# Patient Record
Sex: MALE | Race: WHITE | NOT HISPANIC OR LATINO | URBAN - METROPOLITAN AREA
[De-identification: names, ages, dates, MRNs, and addresses within clinical notes are randomized per-mention and may not be internally consistent; named-entity substitution may affect disease eponyms.]

---

## 2020-07-25 ENCOUNTER — TELEPHONE ENCOUNTER (OUTPATIENT)
Dept: URBAN - METROPOLITAN AREA CLINIC 13 | Facility: CLINIC | Age: 40
End: 2020-07-25

## 2020-07-25 RX ORDER — POLYETHYLENE GLYCOL 3350, SODIUM SULFATE, SODIUM CHLORIDE, POTASSIUM CHLORIDE, ASCORBIC ACID, SODIUM ASCORBATE 7.5-2.691G
USE AS DIRECTED KIT ORAL
Qty: 1 | Refills: 0 | OUTPATIENT
Start: 2013-08-14 | End: 2014-11-10

## 2020-07-25 RX ORDER — SACCHAROMYCES BOULARDII 250 MG
USE AS DIRECTED CAPSULE ORAL
Refills: 0 | OUTPATIENT
End: 2016-10-24

## 2020-07-25 RX ORDER — SULFASALAZINE 500 MG/1
TAKE 2 TABLET 3 TIMES DAILY TABLET ORAL
Qty: 540 | Refills: 3 | OUTPATIENT
Start: 2016-04-28 | End: 2016-05-05

## 2020-07-25 RX ORDER — CETIRIZINE HYDROCHLORIDE 10 MG/1
TAKE 1 TABLET DAILY AS DIRECTED TABLET, FILM COATED ORAL
Refills: 0 | OUTPATIENT
End: 2018-08-07

## 2020-07-25 RX ORDER — B-COMPLEX WITH VITAMIN C
TAKE 1 TABLET DAILY TABLET ORAL
Refills: 0 | OUTPATIENT
End: 2018-08-07

## 2020-07-25 RX ORDER — METHYLPREDNISOLONE 4 MG/1
TABS IN 1ST DAY; THEN 5 TABS IN 2ND DAY; THEN 4 TABS IN 3RD DAY ;THEN 3 TABS IN 4TH DAY; 2 TABS IN 5TH DAY; THEN 1 TAB LAST DAY; THEN STOP TABLET ORAL
Qty: 22 | Refills: 0 | OUTPATIENT
Start: 2016-05-05 | End: 2016-05-12

## 2020-07-25 RX ORDER — OMEPRAZOLE 40 MG/1
TAKE 1 CAPSULE DAILY CAPSULE, DELAYED RELEASE ORAL
Qty: 90 | Refills: 1 | OUTPATIENT
Start: 2013-08-14 | End: 2018-02-26

## 2020-07-25 RX ORDER — METHYLCELLULOSE 500 MG/1
TAKE 1 TABLET AT BEDTIME TABLET ORAL
Refills: 0 | OUTPATIENT
End: 2016-10-24

## 2020-07-26 ENCOUNTER — TELEPHONE ENCOUNTER (OUTPATIENT)
Dept: URBAN - METROPOLITAN AREA CLINIC 13 | Facility: CLINIC | Age: 40
End: 2020-07-26

## 2020-07-26 RX ORDER — MESALAMINE 500 MG/1
TAKE 2 CAPSULES 4 TIMES DAILY CAPSULE ORAL
Qty: 720 | Refills: 3 | Status: ACTIVE | COMMUNITY
Start: 2020-03-12

## 2020-07-26 RX ORDER — MULTIVIT-MIN/IRON/FOLIC ACID/K 18-600-40
TAKE 1 CAPSULE DAILY CAPSULE ORAL
Qty: 30 | Refills: 3 | Status: ACTIVE | COMMUNITY
Start: 2019-11-13

## 2020-07-26 RX ORDER — CYANOCOBALAMIN 1000 UG/ML
INJECTION INTRAMUSCULAR; SUBCUTANEOUS
Qty: 4 | Refills: 0 | Status: ACTIVE | COMMUNITY
Start: 2019-11-03

## 2020-07-26 RX ORDER — DICLOFENAC SODIUM 10 MG/G
GEL TOPICAL
Qty: 300 | Refills: 0 | Status: ACTIVE | COMMUNITY
Start: 2019-11-11

## 2020-07-26 RX ORDER — CEFUROXIME AXETIL 250 MG/1
TABLET ORAL
Qty: 20 | Refills: 0 | Status: ACTIVE | COMMUNITY
Start: 2018-06-04

## 2020-07-26 RX ORDER — ICOSAPENT ETHYL 1000 MG/1
TAKE 2 CAPSULE TWICE DAILY CAPSULE ORAL
Refills: 0 | Status: ACTIVE | COMMUNITY
Start: 2019-12-04

## 2020-07-26 RX ORDER — PREDNISONE 10 MG/1
TABLET ORAL
Qty: 21 | Refills: 0 | Status: ACTIVE | COMMUNITY
Start: 2018-06-04

## 2020-07-26 RX ORDER — CHOLECALCIFEROL (VITAMIN D3) 1250 MCG
TAKE 1 CAPSULE BY MOUTH WEEKLY CAPSULE ORAL
Qty: 4 | Refills: 0 | Status: ACTIVE | COMMUNITY
Start: 2019-09-10

## 2020-07-26 RX ORDER — FLUOCINONIDE 1 MG/G
CREAM TOPICAL
Qty: 120 | Refills: 0 | Status: ACTIVE | COMMUNITY
Start: 2013-08-07

## 2020-07-26 RX ORDER — OMEPRAZOLE 40 MG/1
TAKE 1 CAPSULE BY MOUTH ONCE DAILY CAPSULE, DELAYED RELEASE ORAL
Qty: 30 | Refills: 5 | Status: ACTIVE | COMMUNITY
Start: 2019-12-16

## 2020-07-26 RX ORDER — METHYLPREDNISOLONE 4 MG/1
TABLET ORAL
Qty: 21 | Refills: 0 | Status: ACTIVE | COMMUNITY
Start: 2018-09-05

## 2020-07-26 RX ORDER — AMOXICILLIN 500 MG/1
TAKE ONE TABLET BY MOUTH TWICE A DAY TABLET, FILM COATED ORAL
Qty: 20 | Refills: 0 | Status: ACTIVE | COMMUNITY
Start: 2019-10-17

## 2020-08-11 ENCOUNTER — WEB ENCOUNTER (OUTPATIENT)
Dept: URBAN - METROPOLITAN AREA CLINIC 113 | Facility: CLINIC | Age: 40
End: 2020-08-11

## 2020-08-11 RX ORDER — OMEPRAZOLE 40 MG/1
TAKE 1 CAPSULE BY MOUTH ONCE DAILY CAPSULE, DELAYED RELEASE ORAL
Qty: 30 | Refills: 5
Start: 2019-12-16

## 2020-12-21 ENCOUNTER — TELEPHONE ENCOUNTER (OUTPATIENT)
Dept: URBAN - METROPOLITAN AREA CLINIC 113 | Facility: CLINIC | Age: 40
End: 2020-12-21

## 2020-12-21 ENCOUNTER — LAB OUTSIDE AN ENCOUNTER (OUTPATIENT)
Dept: URBAN - METROPOLITAN AREA CLINIC 113 | Facility: CLINIC | Age: 40
End: 2020-12-21

## 2020-12-21 VITALS — HEIGHT: 68 IN | BODY MASS INDEX: 28.79 KG/M2 | WEIGHT: 190 LBS

## 2020-12-21 RX ORDER — MESALAMINE 500 MG/1
2 CAPSULES CAPSULE ORAL
Qty: 240 | OUTPATIENT
Start: 2020-12-21 | End: 2021-01-20

## 2020-12-21 RX ORDER — CEFUROXIME AXETIL 250 MG/1
TABLET ORAL
Qty: 20 | Refills: 0 | Status: DISCONTINUED | COMMUNITY
Start: 2018-06-04

## 2020-12-21 RX ORDER — CYANOCOBALAMIN 1000 UG/ML
INJECTION INTRAMUSCULAR; SUBCUTANEOUS
Qty: 4 | Refills: 0 | Status: DISCONTINUED | COMMUNITY
Start: 2019-11-03

## 2020-12-21 RX ORDER — MULTIVIT-MIN/IRON/FOLIC ACID/K 18-600-40
TAKE 1 CAPSULE DAILY CAPSULE ORAL
Qty: 30 | Refills: 3 | Status: ACTIVE | COMMUNITY
Start: 2019-11-13

## 2020-12-21 RX ORDER — FLUOCINONIDE 1 MG/G
CREAM TOPICAL
Qty: 120 | Refills: 0 | Status: DISCONTINUED | COMMUNITY
Start: 2013-08-07

## 2020-12-21 RX ORDER — MESALAMINE 500 MG/1
TAKE 2 CAPSULES 4 TIMES DAILY CAPSULE ORAL
Qty: 720 | Refills: 3 | Status: ACTIVE | COMMUNITY
Start: 2020-03-12

## 2020-12-21 RX ORDER — CHOLECALCIFEROL (VITAMIN D3) 1250 MCG
TAKE 1 CAPSULE BY MOUTH WEEKLY CAPSULE ORAL
Qty: 4 | Refills: 0 | Status: ACTIVE | COMMUNITY
Start: 2019-09-10

## 2020-12-21 RX ORDER — METHYLPREDNISOLONE 4 MG/1
TABLET ORAL
Qty: 21 | Refills: 0 | Status: DISCONTINUED | COMMUNITY
Start: 2018-09-05

## 2020-12-21 RX ORDER — PREDNISONE 10 MG/1
TABLET ORAL
Qty: 21 | Refills: 0 | Status: DISCONTINUED | COMMUNITY
Start: 2018-06-04

## 2020-12-21 RX ORDER — OMEPRAZOLE 40 MG/1
TAKE 1 CAPSULE BY MOUTH ONCE DAILY CAPSULE, DELAYED RELEASE ORAL
Qty: 30 | Refills: 5 | Status: ACTIVE | COMMUNITY
Start: 2019-12-16

## 2020-12-21 RX ORDER — DICLOFENAC SODIUM 10 MG/G
GEL TOPICAL
Qty: 300 | Refills: 0 | Status: DISCONTINUED | COMMUNITY
Start: 2019-11-11

## 2020-12-21 RX ORDER — OMEPRAZOLE 40 MG/1
1 CAPSULE 30 MINUTES BEFORE MORNING MEAL CAPSULE, DELAYED RELEASE ORAL ONCE A DAY
Qty: 30 | OUTPATIENT
Start: 2020-12-21

## 2020-12-21 RX ORDER — ICOSAPENT ETHYL 1000 MG/1
TAKE 2 CAPSULE TWICE DAILY CAPSULE ORAL
Refills: 0 | Status: ACTIVE | COMMUNITY
Start: 2019-12-04

## 2020-12-21 RX ORDER — AMOXICILLIN 500 MG/1
TAKE ONE TABLET BY MOUTH TWICE A DAY TABLET, FILM COATED ORAL
Qty: 20 | Refills: 0 | Status: DISCONTINUED | COMMUNITY
Start: 2019-10-17

## 2020-12-21 RX ORDER — SODIUM, POTASSIUM,MAG SULFATES 17.5-3.13G
354 ML SOLUTION, RECONSTITUTED, ORAL ORAL
Qty: 354 ML | Refills: 0 | OUTPATIENT
Start: 2020-12-21 | End: 2020-12-22

## 2021-01-25 ENCOUNTER — CLAIMS CREATED FROM THE CLAIM WINDOW (OUTPATIENT)
Dept: URBAN - METROPOLITAN AREA CLINIC 4 | Facility: CLINIC | Age: 41
End: 2021-01-25
Payer: COMMERCIAL

## 2021-01-25 ENCOUNTER — OFFICE VISIT (OUTPATIENT)
Dept: URBAN - METROPOLITAN AREA SURGERY CENTER 25 | Facility: SURGERY CENTER | Age: 41
End: 2021-01-25
Payer: COMMERCIAL

## 2021-01-25 DIAGNOSIS — K51.00 CHRONIC PANCOLONIC ULCERATIVE COLITIS: ICD-10-CM

## 2021-01-25 DIAGNOSIS — K63.89 OTHER SPECIFIED DISEASES OF INTESTINE: ICD-10-CM

## 2021-01-25 PROCEDURE — 88342 IMHCHEM/IMCYTCHM 1ST ANTB: CPT | Performed by: PATHOLOGY

## 2021-01-25 PROCEDURE — 45380 COLONOSCOPY AND BIOPSY: CPT | Performed by: INTERNAL MEDICINE

## 2021-01-25 PROCEDURE — G8907 PT DOC NO EVENTS ON DISCHARG: HCPCS | Performed by: INTERNAL MEDICINE

## 2021-01-25 PROCEDURE — 88305 TISSUE EXAM BY PATHOLOGIST: CPT | Performed by: PATHOLOGY

## 2021-01-25 PROCEDURE — 88341 IMHCHEM/IMCYTCHM EA ADD ANTB: CPT | Performed by: PATHOLOGY

## 2021-01-25 RX ORDER — CHOLECALCIFEROL (VITAMIN D3) 1250 MCG
TAKE 1 CAPSULE BY MOUTH WEEKLY CAPSULE ORAL
Qty: 4 | Refills: 0 | Status: ACTIVE | COMMUNITY
Start: 2019-09-10

## 2021-01-25 RX ORDER — OMEPRAZOLE 40 MG/1
1 CAPSULE 30 MINUTES BEFORE MORNING MEAL CAPSULE, DELAYED RELEASE ORAL ONCE A DAY
Qty: 30 | Status: ACTIVE | COMMUNITY
Start: 2020-12-21

## 2021-01-25 RX ORDER — ICOSAPENT ETHYL 1000 MG/1
TAKE 2 CAPSULE TWICE DAILY CAPSULE ORAL
Refills: 0 | Status: ACTIVE | COMMUNITY
Start: 2019-12-04

## 2021-01-25 RX ORDER — OMEPRAZOLE 40 MG/1
TAKE 1 CAPSULE BY MOUTH ONCE DAILY CAPSULE, DELAYED RELEASE ORAL
Qty: 30 | Refills: 5 | Status: ACTIVE | COMMUNITY
Start: 2019-12-16

## 2021-01-25 RX ORDER — MESALAMINE 500 MG/1
TAKE 2 CAPSULES 4 TIMES DAILY CAPSULE ORAL
Qty: 720 | Refills: 3 | Status: ACTIVE | COMMUNITY
Start: 2020-03-12

## 2021-01-25 RX ORDER — MULTIVIT-MIN/IRON/FOLIC ACID/K 18-600-40
TAKE 1 CAPSULE DAILY CAPSULE ORAL
Qty: 30 | Refills: 3 | Status: ACTIVE | COMMUNITY
Start: 2019-11-13

## 2021-02-15 ENCOUNTER — OFFICE VISIT (OUTPATIENT)
Dept: URBAN - METROPOLITAN AREA CLINIC 113 | Facility: CLINIC | Age: 41
End: 2021-02-15

## 2021-02-15 RX ORDER — CHOLECALCIFEROL (VITAMIN D3) 1250 MCG
TAKE 1 CAPSULE BY MOUTH WEEKLY CAPSULE ORAL
Qty: 4 | Refills: 0 | Status: ACTIVE | COMMUNITY
Start: 2019-09-10

## 2021-02-15 RX ORDER — MESALAMINE 500 MG/1
TAKE 2 CAPSULES 4 TIMES DAILY CAPSULE ORAL
Qty: 720 | Refills: 3 | Status: ACTIVE | COMMUNITY
Start: 2020-03-12

## 2021-02-15 RX ORDER — MULTIVIT-MIN/IRON/FOLIC ACID/K 18-600-40
TAKE 1 CAPSULE DAILY CAPSULE ORAL
Qty: 30 | Refills: 3 | Status: ACTIVE | COMMUNITY
Start: 2019-11-13

## 2021-02-15 RX ORDER — OMEPRAZOLE 40 MG/1
1 CAPSULE 30 MINUTES BEFORE MORNING MEAL CAPSULE, DELAYED RELEASE ORAL ONCE A DAY
Qty: 30 | Status: ACTIVE | COMMUNITY
Start: 2020-12-21

## 2021-02-15 RX ORDER — OMEPRAZOLE 40 MG/1
TAKE 1 CAPSULE BY MOUTH ONCE DAILY CAPSULE, DELAYED RELEASE ORAL
Qty: 30 | Refills: 5 | Status: ACTIVE | COMMUNITY
Start: 2019-12-16

## 2021-02-15 RX ORDER — ICOSAPENT ETHYL 1000 MG/1
TAKE 2 CAPSULE TWICE DAILY CAPSULE ORAL
Refills: 0 | Status: ACTIVE | COMMUNITY
Start: 2019-12-04

## 2021-02-15 NOTE — HPI-TODAY'S VISIT:
This is a 40-year-old male with a history of pan ulcerative colitis diagnosed in 1996 on Pentasa, GERD, and vitamin D deficiency presenting for follow-up after a surveillance colonoscopy.  Colonoscopy 1/25/2021: BB PS 9, the entire colon appeared normal.  Random biopsies were obtained.   biopsies demonstrated prominent mucosal lymphoid aggregate; otherwise no significant abnormality.  Surveillance recommended in 2023.

## 2021-03-18 ENCOUNTER — TELEPHONE ENCOUNTER (OUTPATIENT)
Dept: URBAN - METROPOLITAN AREA CLINIC 23 | Facility: CLINIC | Age: 41
End: 2021-03-18

## 2021-03-18 RX ORDER — MESALAMINE 500 MG/1
TAKE 2 CAPSULES 4 TIMES DAILY CAPSULE ORAL
Qty: 720 | Refills: 3
Start: 2020-03-12

## 2021-04-12 ENCOUNTER — WEB ENCOUNTER (OUTPATIENT)
Dept: URBAN - METROPOLITAN AREA CLINIC 113 | Facility: CLINIC | Age: 41
End: 2021-04-12

## 2021-04-12 ENCOUNTER — OFFICE VISIT (OUTPATIENT)
Dept: URBAN - METROPOLITAN AREA CLINIC 113 | Facility: CLINIC | Age: 41
End: 2021-04-12
Payer: COMMERCIAL

## 2021-04-12 ENCOUNTER — LAB OUTSIDE AN ENCOUNTER (OUTPATIENT)
Dept: URBAN - METROPOLITAN AREA CLINIC 113 | Facility: CLINIC | Age: 41
End: 2021-04-12

## 2021-04-12 VITALS
HEART RATE: 70 BPM | WEIGHT: 200 LBS | RESPIRATION RATE: 18 BRPM | DIASTOLIC BLOOD PRESSURE: 98 MMHG | BODY MASS INDEX: 30.31 KG/M2 | HEIGHT: 68 IN | SYSTOLIC BLOOD PRESSURE: 147 MMHG | TEMPERATURE: 98.1 F

## 2021-04-12 DIAGNOSIS — R10.12 LEFT UPPER QUADRANT ABDOMINAL PAIN: ICD-10-CM

## 2021-04-12 DIAGNOSIS — R13.10 ESOPHAGEAL DYSPHAGIA: ICD-10-CM

## 2021-04-12 DIAGNOSIS — K51.00 ULCERATIVE PANCOLITIS WITHOUT COMPLICATION: ICD-10-CM

## 2021-04-12 DIAGNOSIS — R14.3 EXCESSIVE GAS: ICD-10-CM

## 2021-04-12 DIAGNOSIS — E55.9 VITAMIN D DEFICIENCY: ICD-10-CM

## 2021-04-12 DIAGNOSIS — R19.4 CHANGE IN BOWEL HABIT: ICD-10-CM

## 2021-04-12 PROCEDURE — 99214 OFFICE O/P EST MOD 30 MIN: CPT | Performed by: NURSE PRACTITIONER

## 2021-04-12 RX ORDER — ICOSAPENT ETHYL 1000 MG/1
TAKE 2 CAPSULE TWICE DAILY CAPSULE ORAL
Refills: 0 | Status: DISCONTINUED | COMMUNITY
Start: 2019-12-04

## 2021-04-12 RX ORDER — MULTIVIT-MIN/IRON/FOLIC ACID/K 18-600-40
TAKE 1 CAPSULE DAILY CAPSULE ORAL
Qty: 30 | Refills: 3 | Status: DISCONTINUED | COMMUNITY
Start: 2019-11-13

## 2021-04-12 RX ORDER — OMEPRAZOLE 40 MG/1
TAKE 1 CAPSULE BY MOUTH ONCE DAILY CAPSULE, DELAYED RELEASE ORAL
Qty: 30 | Refills: 5 | Status: DISCONTINUED | COMMUNITY
Start: 2019-12-16

## 2021-04-12 RX ORDER — MESALAMINE 500 MG/1
TAKE 2 CAPSULES 4 TIMES DAILY CAPSULE ORAL
Qty: 720 | Refills: 3 | Status: ACTIVE | COMMUNITY
Start: 2020-03-12

## 2021-04-12 RX ORDER — CHOLECALCIFEROL (VITAMIN D3) 1250 MCG
TAKE 1 CAPSULE BY MOUTH WEEKLY CAPSULE ORAL
Qty: 4 | Refills: 0 | Status: DISCONTINUED | COMMUNITY
Start: 2019-09-10

## 2021-04-12 RX ORDER — OMEPRAZOLE 40 MG/1
1 CAPSULE 30 MINUTES BEFORE MORNING MEAL CAPSULE, DELAYED RELEASE ORAL ONCE A DAY
Qty: 30 | Status: DISCONTINUED | COMMUNITY
Start: 2020-12-21

## 2021-04-12 NOTE — HPI-OTHER HISTORIES
Colonoscopy 1/25/2021:BBPS 9, the entire colon appeared normal to the terminal ileum.  Biopsies of the ascending, transverse, descending, and sigmoid colon demonstrated prominent mucosal lymphoid aggregate; otherwise no significant abnormality. CT of the abdomen and pelvis with contrast 9/23/2019:No evidence of acute intra-abdominal/pelvic process.  No evidence of inflammatory bowel disease.  Multilevel degenerative changes of the spine with grade 1 retrolisthesis of L5 on S1 noted. EGD 3/23/2013:Normal esophagus, chronic gastritis status post biopsy, normal examined duodenum.  No path.

## 2021-04-12 NOTE — HPI-TODAY'S VISIT:
This is a 40-year-old male with a history of pan ulcerative colitis diagnosed in 1996 presenting for follow-up after a surveillance colonoscopy; results below.    He was last seen in the office 12/16/2019.  He was in deep remission on Pentasa.  He had undergone labs and a CT to evaluate left upper quadrant pain.  These were unremarkable.  It was discussed his symptoms may be acid peptic in origin.  He was prescribed omeprazole 40 mg daily and instructed to continue Pentasa 500 mg 2 4 times a day.  Labs had demonstrated improvement of vitamin D deficiency.  He has experienced a sensation of feeling "a little off" since he obtained his initial Covid vaccine in December.  He reports a change in bowel habits with malodorous flatus and stool.  He has a bowel movement once to 3 times per day.  This is his typical bowel pattern.  Occasionally, he has loose or watery stools but also reports formed or soft stools.  Nocturnal stools are infrequent.  He reports urgency to defecate.  He has experienced straining associated with his bowel movements and feels as though he has to "rock back-and-forth" on the toilet in order to pass stool which is a new development.  He denies red blood per rectum or melena.  he reports a "hard gas" that is  malodorous as well as a change in his  typical stool odor.  He is taking Benefiber or Metamucil on most days.  He began a probiotic in January.  He is on a different probiotic now and reports no change thus far in symptoms.  He denies changes in his diet.  He admits to the use of some dairy products.  He continues to report pain indicated in the left upper quadrant.  It occurs daily and  has been more pronounced in the last few weeks.  He describes it as "feeling something there" or "a sensation."  It is constant and occasionally worsens with meals.  There is no change with bowel movements.  He has heartburn once a week.  He  admits mild difficulties with swallowing describing solid foods briefly lodged in the center of his chest relieved with a second swallow or with drinking liquid.  This happens weekly.  He took omeprazole as prescribed after his last visit and reports there was no change in his symptoms. He reports he is having symptoms that are consistent with exocrine pancreatic insufficiency. He is due a physical and has not had recent labs.

## 2021-04-16 ENCOUNTER — TELEPHONE ENCOUNTER (OUTPATIENT)
Dept: URBAN - METROPOLITAN AREA CLINIC 113 | Facility: CLINIC | Age: 41
End: 2021-04-16

## 2021-05-10 ENCOUNTER — OFFICE VISIT (OUTPATIENT)
Dept: URBAN - METROPOLITAN AREA SURGERY CENTER 25 | Facility: SURGERY CENTER | Age: 41
End: 2021-05-10
Payer: COMMERCIAL

## 2021-05-10 ENCOUNTER — CLAIMS CREATED FROM THE CLAIM WINDOW (OUTPATIENT)
Dept: URBAN - METROPOLITAN AREA CLINIC 4 | Facility: CLINIC | Age: 41
End: 2021-05-10
Payer: COMMERCIAL

## 2021-05-10 DIAGNOSIS — R13.10 ABNORMAL SWALLOWING: ICD-10-CM

## 2021-05-10 DIAGNOSIS — R10.12 ABDOMINAL BURNING SENSATION IN LEFT UPPER QUADRANT: ICD-10-CM

## 2021-05-10 DIAGNOSIS — K31.89 GASTRIC PERFORATION WITHOUT ULCER: ICD-10-CM

## 2021-05-10 PROCEDURE — G8907 PT DOC NO EVENTS ON DISCHARG: HCPCS | Performed by: INTERNAL MEDICINE

## 2021-05-10 PROCEDURE — 88312 SPECIAL STAINS GROUP 1: CPT | Performed by: PATHOLOGY

## 2021-05-10 PROCEDURE — 88305 TISSUE EXAM BY PATHOLOGIST: CPT | Performed by: PATHOLOGY

## 2021-05-10 PROCEDURE — 43239 EGD BIOPSY SINGLE/MULTIPLE: CPT | Performed by: INTERNAL MEDICINE

## 2021-05-10 PROCEDURE — 43450 DILATE ESOPHAGUS 1/MULT PASS: CPT | Performed by: INTERNAL MEDICINE

## 2021-05-10 RX ORDER — MESALAMINE 500 MG/1
TAKE 2 CAPSULES 4 TIMES DAILY CAPSULE ORAL
Qty: 720 | Refills: 3 | Status: ACTIVE | COMMUNITY
Start: 2020-03-12

## 2021-05-19 ENCOUNTER — TELEPHONE ENCOUNTER (OUTPATIENT)
Dept: URBAN - METROPOLITAN AREA CLINIC 113 | Facility: CLINIC | Age: 41
End: 2021-05-19

## 2021-05-19 RX ORDER — SUCRALFATE 1 G
1 TABLET ON AN EMPTY STOMACH TABLET ORAL
Qty: 120 TABLET | Refills: 1 | OUTPATIENT
Start: 2021-05-19 | End: 2021-07-18

## 2021-05-19 RX ORDER — MESALAMINE 500 MG/1
TAKE 2 CAPSULES 4 TIMES DAILY CAPSULE ORAL
Qty: 720 | Refills: 3 | Status: ACTIVE | COMMUNITY
Start: 2020-03-12

## 2021-05-20 ENCOUNTER — WEB ENCOUNTER (OUTPATIENT)
Dept: URBAN - METROPOLITAN AREA CLINIC 113 | Facility: CLINIC | Age: 41
End: 2021-05-20

## 2021-05-20 RX ORDER — MESALAMINE 500 MG/1
TAKE 2 CAPSULES 4 TIMES DAILY CAPSULE ORAL
Qty: 240 | Refills: 3
Start: 2020-03-12 | End: 2021-09-17

## 2021-07-26 ENCOUNTER — OFFICE VISIT (OUTPATIENT)
Dept: URBAN - METROPOLITAN AREA CLINIC 113 | Facility: CLINIC | Age: 41
End: 2021-07-26
Payer: COMMERCIAL

## 2021-07-26 VITALS
DIASTOLIC BLOOD PRESSURE: 93 MMHG | HEIGHT: 68 IN | HEART RATE: 71 BPM | TEMPERATURE: 98 F | BODY MASS INDEX: 30.31 KG/M2 | SYSTOLIC BLOOD PRESSURE: 140 MMHG | WEIGHT: 200 LBS

## 2021-07-26 DIAGNOSIS — R13.10 ESOPHAGEAL DYSPHAGIA: ICD-10-CM

## 2021-07-26 DIAGNOSIS — E55.9 VITAMIN D DEFICIENCY: ICD-10-CM

## 2021-07-26 DIAGNOSIS — R14.3 EXCESSIVE GAS: ICD-10-CM

## 2021-07-26 DIAGNOSIS — R10.12 LEFT UPPER QUADRANT ABDOMINAL PAIN: ICD-10-CM

## 2021-07-26 DIAGNOSIS — R19.4 CHANGE IN BOWEL HABIT: ICD-10-CM

## 2021-07-26 DIAGNOSIS — K51.00 ULCERATIVE PANCOLITIS WITHOUT COMPLICATION: ICD-10-CM

## 2021-07-26 PROBLEM — 80301007: Status: ACTIVE | Noted: 2021-04-12

## 2021-07-26 PROBLEM — 129851009: Status: ACTIVE | Noted: 2021-04-12

## 2021-07-26 PROBLEM — 34713006: Status: ACTIVE | Noted: 2021-04-12

## 2021-07-26 PROBLEM — 40890009: Status: ACTIVE | Noted: 2021-04-12

## 2021-07-26 PROCEDURE — 99214 OFFICE O/P EST MOD 30 MIN: CPT | Performed by: INTERNAL MEDICINE

## 2021-07-26 RX ORDER — LORATADINE 10 MG/1
1 TABLET TABLET ORAL ONCE A DAY
Status: ACTIVE | COMMUNITY

## 2021-07-26 RX ORDER — PSYLLIUM SEED (WITH DEXTROSE)
AS DIRECTED POWDER (GRAM) ORAL
Status: ACTIVE | COMMUNITY

## 2021-07-26 RX ORDER — MESALAMINE 500 MG/1
TAKE 2 CAPSULES 4 TIMES DAILY CAPSULE ORAL
Qty: 240 | Refills: 3 | Status: ACTIVE | COMMUNITY
Start: 2020-03-12 | End: 2021-09-17

## 2021-07-26 NOTE — HPI-TODAY'S VISIT:
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 He was last seen here on 4/12/21 after a surveillance colonoscopy; results below.    Whe he was previously seen in the office on 12/16/2019, he was in deep remission on Pentasa.  He had undergone labs and a CT to evaluate left upper quadrant pain.  These were unremarkable.  It was discussed his symptoms may be acid peptic in origin.  He was prescribed omeprazole 40 mg daily and instructed to continue Pentasa 500 mg two tablets 4 times a day.  Labs had demonstrated improvement of vitamin D deficiency.  If the time of his visit here in April, he described a sensation of feeling "a little off" since he obtained his initial Covid vaccine in December.  He reported a change in bowel habits with malodorous flatus and stool.  He was having a bowel movement once to 3 times per day, which is his typical bowel pattern.  Occasionally, he had loose or watery stools but also reported formed or soft stools.  Nocturnal stools were infrequent.  He reported urgency to defecate.  He has experienced straining associated with his bowel movements and felt as though he had to "rock back-and-forth" on the toilet in order to pass stool, which was a new development.  He denied red blood per rectum or melena.  He reported the passage of excessively malodorous gas as well as a change in his  typical stool odor.  He was taking Benefiber or Metamucil on most days and began a probiotic in January.   He continued to report pain indicated in the left upper quadrant, occurring daily and more pronounced in the previous few weeks.  It was a constant "sensation," occasionally worse with meals.  There was no change with bowel movements.  He had heartburn once a week and admitted mild solid food dysphagia.  He took omeprazole as prescribed after his last visit with no change in his symptoms.  He reported he was having symptoms consistent with exocrine pancreatic insufficiency.   Labs and x-rays were ordered after his last visit.  He had an essentially normal complete metabolic panel, including a normal lipase, an abdominal x-ray series showing a moderate fecal load and some possible kidney stones in the left kidney, and a normal vitamin D level at 36.4.  His CBC is notable for a white blood cell count of 4900, and a hemoglobin of 15.9 g/dL.  Upper GI endoscopy performed on May 10, 2021 was notable for no abnormal findings to explain his dysphagia, but in.  Dilatation was undertaken with a 46 Thai Rubin dilator.  Endoscopic findings of gastritis related, but biopsies were negative for any pathology.  A CT scan of the abdomen and pelvis performed on June 21, 2021 revealing right lower quadrant mesenteric adenitis and some mild thickening of the wall the cecum possibly consistent with colitis.  The terminal ileum was normal.  After his last visit, he had labs performed and an abdominal x-ray series done.  His complete metabolic panel was essentially normal, with a sodium of 134, potassium 5.0, chloride 1-3, bicarbonate 28, glucose 112, BUN 13, creatinine 0.9, total protein 7.3, albumin 4.5, total bilirubin 0.6, AST 32, ALT 36, and alkaline phosphatase 52.  His lipase is 1:15.  CBC showed a white blood cell count of 4900, and a hemoglobin of 15.9.  An abdominal x-ray series showeed moderate fecal load, and some calcifications possibly consistent with kidney stones.  He was scheduled for an upper GI endoscopy which took place on May 10, 2021.  This revealed changes consistent with gastritis, although biopsies were negative.  Esophagus was empirically dilated with a 46 Thai Rubin dilator.  The patient subsequently had a CT scan at the abdomen and pelvis performed on June 21, 2021.  This revealed right lower quadrant mesenteric adenitis with cecal thickening, but a normal terminal ileum and no other abnormalities.  The patient took Carafate for about a month and stopped it.  His symptoms improved.  He has been on Dexilant 60 mg daily since the third week in May.  His dyspeptic complaints are controlled on this medication.  He switched Benefiber or Metamucil, as this seems to be helping his bowels better.  He has noted that he has increased symptoms of left upper quadrant discomfort when his bowels are not regular.  If his bowels are moving, he has fewer symptoms.  His dysphagia resolved after upper endoscopy with dilatation, and has not recurred.  He denies rectal bleeding, diarrhea, etc.

## 2021-08-04 ENCOUNTER — ERX REFILL RESPONSE (OUTPATIENT)
Dept: URBAN - METROPOLITAN AREA CLINIC 113 | Facility: CLINIC | Age: 41
End: 2021-08-04

## 2021-08-04 RX ORDER — MESALAMINE 500 MG/1
TAKE TWO CAPSULES BY MOUTH FOUR TIMES A DAY CAPSULE ORAL
Qty: 240 CAPSULE | Refills: 4 | OUTPATIENT

## 2021-08-04 RX ORDER — MESALAMINE 500 MG/1
TAKE 2 CAPSULES 4 TIMES DAILY CAPSULE ORAL
Qty: 240 | Refills: 3 | OUTPATIENT

## 2021-11-03 ENCOUNTER — ERX REFILL RESPONSE (OUTPATIENT)
Dept: URBAN - METROPOLITAN AREA CLINIC 113 | Facility: CLINIC | Age: 41
End: 2021-11-03

## 2021-11-03 RX ORDER — MESALAMINE 500 MG/1
TAKE TWO CAPSULES BY MOUTH FOUR TIMES A DAY CAPSULE ORAL
Qty: 240 CAPSULE | Refills: 4 | OUTPATIENT

## 2022-01-10 ENCOUNTER — ERX REFILL RESPONSE (OUTPATIENT)
Dept: URBAN - METROPOLITAN AREA CLINIC 113 | Facility: CLINIC | Age: 42
End: 2022-01-10

## 2022-01-10 RX ORDER — MESALAMINE 500 MG/1
TAKE TWO CAPSULES BY MOUTH FOUR TIMES A DAY CAPSULE ORAL
Qty: 240 CAPSULE | Refills: 4 | OUTPATIENT

## 2022-01-18 ENCOUNTER — ERX REFILL RESPONSE (OUTPATIENT)
Dept: URBAN - METROPOLITAN AREA CLINIC 113 | Facility: CLINIC | Age: 42
End: 2022-01-18

## 2022-01-18 RX ORDER — MESALAMINE 500 MG/1
TAKE TWO CAPSULES BY MOUTH FOUR TIMES A DAY CAPSULE ORAL
Qty: 240 CAPSULE | Refills: 4 | OUTPATIENT

## 2022-04-18 ENCOUNTER — ERX REFILL RESPONSE (OUTPATIENT)
Dept: URBAN - METROPOLITAN AREA CLINIC 113 | Facility: CLINIC | Age: 42
End: 2022-04-18

## 2022-04-18 RX ORDER — MESALAMINE 500 MG/1
TAKE TWO CAPSULES BY MOUTH FOUR TIMES A DAY CAPSULE ORAL
Qty: 240 CAPSULE | Refills: 4 | OUTPATIENT

## 2022-07-18 ENCOUNTER — ERX REFILL RESPONSE (OUTPATIENT)
Dept: URBAN - METROPOLITAN AREA CLINIC 113 | Facility: CLINIC | Age: 42
End: 2022-07-18

## 2022-07-18 RX ORDER — MESALAMINE 500 MG/1
TAKE TWO CAPSULES BY MOUTH FOUR TIMES A DAY CAPSULE ORAL
Qty: 240 CAPSULE | Refills: 4 | OUTPATIENT

## 2022-07-18 RX ORDER — MESALAMINE 500 MG/1
TAKE TWO CAPSULES BY MOUTH FOUR TIMES A DAY CAPSULE ORAL
Qty: 240 CAPSULE | Refills: 3 | OUTPATIENT

## 2022-08-01 ENCOUNTER — OFFICE VISIT (OUTPATIENT)
Dept: URBAN - METROPOLITAN AREA CLINIC 113 | Facility: CLINIC | Age: 42
End: 2022-08-01

## 2022-08-02 ENCOUNTER — TELEPHONE ENCOUNTER (OUTPATIENT)
Dept: URBAN - METROPOLITAN AREA CLINIC 113 | Facility: CLINIC | Age: 42
End: 2022-08-02

## 2022-08-03 ENCOUNTER — TELEPHONE ENCOUNTER (OUTPATIENT)
Dept: URBAN - METROPOLITAN AREA CLINIC 113 | Facility: CLINIC | Age: 42
End: 2022-08-03

## 2022-08-03 RX ORDER — MESALAMINE 500 MG/1
TAKE TWO CAPSULES BY MOUTH FOUR TIMES A DAY CAPSULE ORAL
Qty: 240 CAPSULE | Refills: 3 | Status: ACTIVE | COMMUNITY

## 2022-08-03 RX ORDER — PSYLLIUM SEED (WITH DEXTROSE)
AS DIRECTED POWDER (GRAM) ORAL
Status: ACTIVE | COMMUNITY

## 2022-08-03 RX ORDER — LORATADINE 10 MG/1
1 TABLET TABLET ORAL ONCE A DAY
Status: ACTIVE | COMMUNITY

## 2022-08-03 RX ORDER — MESALAMINE 375 MG/1
4 CAPSULES IN THE MORNING CAPSULE, EXTENDED RELEASE ORAL ONCE A DAY
Qty: 120 | Refills: 6 | OUTPATIENT
Start: 2022-08-03 | End: 2023-02-28

## 2022-08-03 RX ORDER — ESOMEPRAZOLE MAGNESIUM 40 MG/1
1 CAPSULE CAPSULE, DELAYED RELEASE ORAL TWICE DAILY
Qty: 60 | Refills: 3 | OUTPATIENT

## 2022-08-31 ENCOUNTER — OFFICE VISIT (OUTPATIENT)
Dept: URBAN - METROPOLITAN AREA CLINIC 113 | Facility: CLINIC | Age: 42
End: 2022-08-31
Payer: COMMERCIAL

## 2022-08-31 VITALS
BODY MASS INDEX: 29.4 KG/M2 | HEIGHT: 68 IN | HEART RATE: 70 BPM | TEMPERATURE: 98.8 F | SYSTOLIC BLOOD PRESSURE: 132 MMHG | WEIGHT: 194 LBS | DIASTOLIC BLOOD PRESSURE: 82 MMHG

## 2022-08-31 DIAGNOSIS — K51.00 ULCERATIVE PANCOLITIS WITHOUT COMPLICATION: ICD-10-CM

## 2022-08-31 DIAGNOSIS — K59.09 OTHER CONSTIPATION: ICD-10-CM

## 2022-08-31 DIAGNOSIS — R10.12 LEFT UPPER QUADRANT ABDOMINAL PAIN: ICD-10-CM

## 2022-08-31 DIAGNOSIS — E61.1 IRON DEFICIENCY: ICD-10-CM

## 2022-08-31 PROCEDURE — 99214 OFFICE O/P EST MOD 30 MIN: CPT | Performed by: NURSE PRACTITIONER

## 2022-08-31 RX ORDER — PSYLLIUM SEED (WITH DEXTROSE)
AS DIRECTED POWDER (GRAM) ORAL
Status: ACTIVE | COMMUNITY

## 2022-08-31 RX ORDER — ESOMEPRAZOLE MAGNESIUM 40 MG/1
1 CAPSULE CAPSULE, DELAYED RELEASE ORAL TWICE DAILY
Qty: 60 | Refills: 3 | Status: ACTIVE | COMMUNITY

## 2022-08-31 RX ORDER — MESALAMINE 500 MG/1
TAKE TWO CAPSULES BY MOUTH FOUR TIMES A DAY CAPSULE ORAL
Qty: 240 CAPSULE | Refills: 3 | Status: ACTIVE | COMMUNITY

## 2022-08-31 RX ORDER — HYOSCYAMINE SULFATE 0.125 MG
1 - 2 TABLETS UNDER THE TONGUE AND ALLOW TO DISSOLVE TABLET,DISINTEGRATING ORAL
Qty: 40 | Refills: 3 | OUTPATIENT
Start: 2022-08-31 | End: 2022-12-28

## 2022-08-31 RX ORDER — ATORVASTATIN CALCIUM 10 MG/1
1 TABLET TABLET, FILM COATED ORAL ONCE A DAY
Status: ACTIVE | COMMUNITY

## 2022-08-31 RX ORDER — LOSARTAN POTASSIUM AND HYDROCHLOROTHIAZIDE 50; 12.5 MG/1; MG/1
1 TABLET TABLET, FILM COATED ORAL ONCE A DAY
Status: ACTIVE | COMMUNITY

## 2022-08-31 RX ORDER — MESALAMINE 375 MG/1
4 CAPSULES IN THE MORNING CAPSULE, EXTENDED RELEASE ORAL ONCE A DAY
Qty: 120 | Refills: 6 | Status: ON HOLD | COMMUNITY
Start: 2022-08-03 | End: 2023-02-28

## 2022-08-31 NOTE — HPI-TODAY'S VISIT:
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 He was last seen 7/26/2021.  He had undergone an EGD in May 2021 revealing changes consistent with gastritis with negative biopsies and a normal esophagus which was empirically dilated to 46 French due to a prior complaint of dysphagia.  This had resolved at his visit.  He had mild constipation that had improved on Metamucil.  He had a persistent left upper quadrant abdominal pain.  A prior CT scan was unremarkable.  He reported it was better with defecation and functional pain was suspected.  He was to begin a diet low in fermentable sugars for excessive gas and use Charcocaps as needed.  Treatment for bacterial overgrowth was a consideration. Due to a change in formulary with his insurance company, he was prescribed mesalamine 0.375 g 4 tablets daily. He continues to take Pentasa as he currently has a supply.  He is questioning a change to mesalamine 0.375 g due to a change in strength.  He reports recent labs demonstrated iron deficiency.  He has been instructed to take ferrous sulfate 325 mg daily.  He reports some constipation since he began iron supplementation.  He did better on a lower dose in the form of a gummy in the past.  He is having 1-2 bowel movements per day and continues to take daily Metamucil.  He denies diarrhea, red blood per rectum, or nausea.  He has constant pain indicated the left upper quadrant described as a burning or pressure.  It waxes and wanes.  He reports it is unassociated with meals or defecation.  He is of the opinion it may be associated with prior finding of gastritis.  Due to formulary changes, Dexilant has been changed to Nexium 40 mg twice a day.  He is no longer taking famotidine at bedtime.  He is concerned that he may need a repeat EGD.

## 2022-08-31 NOTE — HPI-OTHER HISTORIES
Labs 6/14/2022:Uric acid 5.6.  Creatinine kinase 61.  Hemoglobin A1c 5.7.  Urinalysis notable for trace ketones.  Ferritin 688.  ESR 3.  CRP 41.  Lipid panel normal with exception of cholesterol 244, .  BNP normal.  LFTs: TB 0.6, ALP 52, ALT 39, AST 31.  PSA 0.4.  Iron 24, TIBC 318, iron saturation 8%.  CBC: WBC 4.9, hemoglobin 15.3, MCV 94, platelet 129.  TSH 1.990.

## 2022-09-03 PROBLEM — 14760008: Status: ACTIVE | Noted: 2022-09-03

## 2022-09-03 PROBLEM — 301715003: Status: ACTIVE | Noted: 2021-04-12

## 2022-09-03 PROBLEM — 35240004: Status: ACTIVE | Noted: 2022-08-31

## 2022-11-17 ENCOUNTER — TELEPHONE ENCOUNTER (OUTPATIENT)
Dept: URBAN - METROPOLITAN AREA CLINIC 113 | Facility: CLINIC | Age: 42
End: 2022-11-17

## 2022-11-17 ENCOUNTER — LAB OUTSIDE AN ENCOUNTER (OUTPATIENT)
Dept: URBAN - METROPOLITAN AREA CLINIC 113 | Facility: CLINIC | Age: 42
End: 2022-11-17

## 2022-11-17 RX ORDER — SODIUM, POTASSIUM,MAG SULFATES 17.5-3.13G
354ML SOLUTION, RECONSTITUTED, ORAL ORAL
Qty: 354 MILLILITER | Refills: 0 | OUTPATIENT
Start: 2022-11-17 | End: 2022-11-18

## 2022-11-17 RX ORDER — PSYLLIUM SEED (WITH DEXTROSE)
AS DIRECTED POWDER (GRAM) ORAL
Status: ACTIVE | COMMUNITY

## 2022-11-17 RX ORDER — ATORVASTATIN CALCIUM 10 MG/1
1 TABLET TABLET, FILM COATED ORAL ONCE A DAY
Status: ACTIVE | COMMUNITY

## 2022-11-17 RX ORDER — LOSARTAN POTASSIUM AND HYDROCHLOROTHIAZIDE 50; 12.5 MG/1; MG/1
1 TABLET TABLET, FILM COATED ORAL ONCE A DAY
Status: ACTIVE | COMMUNITY

## 2022-11-17 RX ORDER — ESOMEPRAZOLE MAGNESIUM 40 MG/1
1 CAPSULE CAPSULE, DELAYED RELEASE ORAL TWICE DAILY
Qty: 60 | Refills: 3 | Status: ACTIVE | COMMUNITY

## 2022-11-17 RX ORDER — HYOSCYAMINE SULFATE 0.125 MG
1 - 2 TABLETS UNDER THE TONGUE AND ALLOW TO DISSOLVE TABLET,DISINTEGRATING ORAL
Qty: 40 | Refills: 3 | Status: ACTIVE | COMMUNITY
Start: 2022-08-31 | End: 2022-12-28

## 2022-11-17 RX ORDER — MESALAMINE 500 MG/1
TAKE TWO CAPSULES BY MOUTH FOUR TIMES A DAY CAPSULE ORAL
Qty: 240 CAPSULE | Refills: 3 | Status: ACTIVE | COMMUNITY

## 2022-11-17 RX ORDER — MESALAMINE 375 MG/1
4 CAPSULES IN THE MORNING CAPSULE, EXTENDED RELEASE ORAL ONCE A DAY
Qty: 120 | Refills: 6 | Status: ON HOLD | COMMUNITY
Start: 2022-08-03 | End: 2023-02-28

## 2022-12-07 ENCOUNTER — TELEPHONE ENCOUNTER (OUTPATIENT)
Dept: URBAN - METROPOLITAN AREA CLINIC 113 | Facility: CLINIC | Age: 42
End: 2022-12-07

## 2022-12-21 ENCOUNTER — ERX REFILL RESPONSE (OUTPATIENT)
Dept: URBAN - METROPOLITAN AREA CLINIC 113 | Facility: CLINIC | Age: 42
End: 2022-12-21

## 2022-12-21 RX ORDER — ESOMEPRAZOLE MAGNESIUM 40 MG/1
TAKE ONE CAPSULE BY MOUTH TWICE A DAY CAPSULE, DELAYED RELEASE ORAL
Qty: 60 CAPSULE | Refills: 3 | OUTPATIENT

## 2022-12-21 RX ORDER — ESOMEPRAZOLE MAGNESIUM 40 MG/1
1 CAPSULE CAPSULE, DELAYED RELEASE ORAL TWICE DAILY
Qty: 60 | Refills: 3 | OUTPATIENT

## 2023-02-13 ENCOUNTER — OFFICE VISIT (OUTPATIENT)
Dept: URBAN - METROPOLITAN AREA CLINIC 113 | Facility: CLINIC | Age: 43
End: 2023-02-13

## 2023-03-08 PROBLEM — 444548001: Status: ACTIVE | Noted: 2021-04-12

## 2023-03-21 ENCOUNTER — ERX REFILL RESPONSE (OUTPATIENT)
Dept: URBAN - METROPOLITAN AREA CLINIC 113 | Facility: CLINIC | Age: 43
End: 2023-03-21

## 2023-03-21 ENCOUNTER — TELEPHONE ENCOUNTER (OUTPATIENT)
Dept: URBAN - METROPOLITAN AREA CLINIC 113 | Facility: CLINIC | Age: 43
End: 2023-03-21

## 2023-03-21 RX ORDER — MESALAMINE 0.38 G/1
TAKE FOUR CAPSULES BY MOUTH EVERY MORNING FOR 30 DAYS CAPSULE, EXTENDED RELEASE ORAL
Qty: 120 CAPSULE | Refills: 7 | OUTPATIENT

## 2023-03-21 RX ORDER — MESALAMINE 0.38 G/1
TAKE FOUR CAPSULES BY MOUTH EVERY MORNING FOR 30 DAYS CAPSULE, EXTENDED RELEASE ORAL
Qty: 120 CAPSULE | Refills: 6 | OUTPATIENT

## 2023-03-27 ENCOUNTER — OFFICE VISIT (OUTPATIENT)
Dept: URBAN - METROPOLITAN AREA SURGERY CENTER 25 | Facility: SURGERY CENTER | Age: 43
End: 2023-03-27
Payer: COMMERCIAL

## 2023-03-27 ENCOUNTER — CLAIMS CREATED FROM THE CLAIM WINDOW (OUTPATIENT)
Dept: URBAN - METROPOLITAN AREA CLINIC 4 | Facility: CLINIC | Age: 43
End: 2023-03-27
Payer: COMMERCIAL

## 2023-03-27 DIAGNOSIS — K51.00 ULCERATIVE PANCOLITIS WITHOUT COMPLICATION: ICD-10-CM

## 2023-03-27 DIAGNOSIS — K63.89 OTHER SPECIFIED DISEASES OF INTESTINE: ICD-10-CM

## 2023-03-27 PROCEDURE — 88305 TISSUE EXAM BY PATHOLOGIST: CPT | Performed by: PATHOLOGY

## 2023-03-27 PROCEDURE — 45380 COLONOSCOPY AND BIOPSY: CPT | Performed by: INTERNAL MEDICINE

## 2023-03-27 PROCEDURE — G8907 PT DOC NO EVENTS ON DISCHARG: HCPCS | Performed by: INTERNAL MEDICINE

## 2023-03-27 RX ORDER — LOSARTAN POTASSIUM AND HYDROCHLOROTHIAZIDE 50; 12.5 MG/1; MG/1
1 TABLET TABLET, FILM COATED ORAL ONCE A DAY
Status: ACTIVE | COMMUNITY

## 2023-03-27 RX ORDER — MESALAMINE 0.38 G/1
TAKE FOUR CAPSULES BY MOUTH EVERY MORNING FOR 30 DAYS CAPSULE, EXTENDED RELEASE ORAL
Qty: 120 CAPSULE | Refills: 6 | Status: ACTIVE | COMMUNITY

## 2023-03-27 RX ORDER — PSYLLIUM SEED (WITH DEXTROSE)
AS DIRECTED POWDER (GRAM) ORAL
Status: ACTIVE | COMMUNITY

## 2023-03-27 RX ORDER — ESOMEPRAZOLE MAGNESIUM 40 MG/1
TAKE ONE CAPSULE BY MOUTH TWICE A DAY CAPSULE, DELAYED RELEASE ORAL
Qty: 60 CAPSULE | Refills: 3 | Status: ACTIVE | COMMUNITY

## 2023-03-27 RX ORDER — ATORVASTATIN CALCIUM 10 MG/1
1 TABLET TABLET, FILM COATED ORAL ONCE A DAY
Status: ACTIVE | COMMUNITY

## 2023-03-27 RX ORDER — MESALAMINE 500 MG/1
TAKE TWO CAPSULES BY MOUTH FOUR TIMES A DAY CAPSULE ORAL
Qty: 240 CAPSULE | Refills: 3 | Status: ACTIVE | COMMUNITY

## 2023-04-20 ENCOUNTER — ERX REFILL RESPONSE (OUTPATIENT)
Dept: URBAN - METROPOLITAN AREA CLINIC 113 | Facility: CLINIC | Age: 43
End: 2023-04-20

## 2023-04-20 RX ORDER — ESOMEPRAZOLE MAGNESIUM 40 MG/1
TAKE ONE CAPSULE BY MOUTH TWICE A DAY CAPSULE, DELAYED RELEASE ORAL
Qty: 60 CAPSULE | Refills: 3 | OUTPATIENT

## 2023-08-18 ENCOUNTER — ERX REFILL RESPONSE (OUTPATIENT)
Dept: URBAN - METROPOLITAN AREA CLINIC 113 | Facility: CLINIC | Age: 43
End: 2023-08-18

## 2023-08-18 RX ORDER — ESOMEPRAZOLE MAGNESIUM 40 MG/1
TAKE ONE CAPSULE BY MOUTH TWICE A DAY CAPSULE, DELAYED RELEASE ORAL
Qty: 60 CAPSULE | Refills: 3 | OUTPATIENT

## 2023-10-17 ENCOUNTER — ERX REFILL RESPONSE (OUTPATIENT)
Dept: URBAN - METROPOLITAN AREA CLINIC 113 | Facility: CLINIC | Age: 43
End: 2023-10-17

## 2023-10-17 RX ORDER — MESALAMINE 0.38 G/1
TAKE FOUR CAPSULES BY MOUTH EVERY MORNING CAPSULE, EXTENDED RELEASE ORAL
Qty: 120 CAPSULE | Refills: 6 | OUTPATIENT

## 2023-10-17 RX ORDER — MESALAMINE 0.38 G/1
TAKE FOUR CAPSULES BY MOUTH EVERY MORNING FOR 30 DAYS CAPSULE, EXTENDED RELEASE ORAL
Qty: 120 CAPSULE | Refills: 6 | OUTPATIENT

## 2024-03-29 ENCOUNTER — OV EP (OUTPATIENT)
Dept: URBAN - METROPOLITAN AREA CLINIC 113 | Facility: CLINIC | Age: 44
End: 2024-03-29

## 2024-03-29 VITALS
BODY MASS INDEX: 29.55 KG/M2 | WEIGHT: 195 LBS | DIASTOLIC BLOOD PRESSURE: 70 MMHG | HEART RATE: 74 BPM | RESPIRATION RATE: 16 BRPM | HEIGHT: 68 IN | SYSTOLIC BLOOD PRESSURE: 128 MMHG | TEMPERATURE: 97.6 F

## 2024-03-29 RX ORDER — PSYLLIUM SEED (WITH DEXTROSE)
AS DIRECTED POWDER (GRAM) ORAL
Status: ACTIVE | COMMUNITY

## 2024-03-29 RX ORDER — ICOSAPENT ETHYL 1000 MG/1
2 CAPSULES WITH MEALS CAPSULE ORAL TWICE A DAY
Status: ACTIVE | COMMUNITY

## 2024-03-29 RX ORDER — MESALAMINE 500 MG/1
TAKE TWO CAPSULES BY MOUTH FOUR TIMES A DAY CAPSULE ORAL
Qty: 240 CAPSULE | Refills: 3 | Status: ON HOLD | COMMUNITY

## 2024-03-29 RX ORDER — ATORVASTATIN CALCIUM 10 MG/1
1 TABLET TABLET, FILM COATED ORAL ONCE A DAY
Status: ACTIVE | COMMUNITY

## 2024-03-29 RX ORDER — LOSARTAN POTASSIUM AND HYDROCHLOROTHIAZIDE 50; 12.5 MG/1; MG/1
1 TABLET TABLET, FILM COATED ORAL ONCE A DAY
Status: ACTIVE | COMMUNITY

## 2024-03-29 RX ORDER — AMLODIPINE BESYLATE 5 MG/1
1 TABLET TABLET ORAL ONCE A DAY
Status: ACTIVE | COMMUNITY

## 2024-03-29 RX ORDER — MESALAMINE 0.38 G/1
TAKE FOUR CAPSULES BY MOUTH EVERY MORNING CAPSULE, EXTENDED RELEASE ORAL
Qty: 120 CAPSULE | Refills: 6 | Status: ACTIVE | COMMUNITY

## 2024-03-29 RX ORDER — MESALAMINE 0.38 G/1
TAKE FOUR CAPSULES BY MOUTH EVERY MORNING CAPSULE, EXTENDED RELEASE ORAL
Qty: 360 | Refills: 4

## 2024-03-29 RX ORDER — ESOMEPRAZOLE MAGNESIUM 40 MG/1
TAKE ONE CAPSULE BY MOUTH TWICE A DAY CAPSULE, DELAYED RELEASE ORAL
Qty: 60 CAPSULE | Refills: 3 | Status: ON HOLD | COMMUNITY

## 2024-03-29 NOTE — HPI-TODAY'S VISIT:
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 He was last seen in the office 8/31/2022.  He remained in deep remission on Pentasa (mesalamine 500 mg 2 capsules 4 times a day).  He was doing well from a clinical standpoint.  Due to a change in strength, he was concerned regarding starting mesalamine ER 0.375 g 4 tablets daily.  He was reassured regarding the efficacy of both medications regardless of strength due to delivery systems.  He was to begin mesalamine ER once he had exhausted his supply of his current medication.  He was taking esomeprazole 40 mg twice a day.  He denied acid reflux.  He had persistent left upper quadrant abdominal pain that waxed and waned in severity unassociated with meals or defecation.  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  He was prescribed hyoscyamine for symptomatic relief.  This was discussed with Dr. Flores post visit who did not feel as though repeat EGD was indicated at that time.  Labs showed low iron and iron saturation.  He was not anemic.  He was to continue daily iron per PCP recommendation.  Constipation was slightly worse on iron.  He was to change his formulation and continue Metamucil daily. Surveillance colonoscopy 3/27/2023:BBPS 9 (Suprep), entire examined colon normal status post biopsy, normal examined terminal ileum.  Pathology: Random biopsies demonstrated no significant abnormality.  Surveillance recommended in 2025. He is doing well on mesalamine 0.375 g 4 tablets daily.  He is having regular bowel movements.  He stopped taking esomeprazole due to persistent, occasional discomfort in the left upper quadrant that had not improved on medication.  He denies any other abdominal symptoms. Labs 2/23/2024:Uric acid 5.1.  T46.4.  Creatinine kinase 99.  Hemoglobin A1c 5.9.  Urinalysis unremarkable.  Ferritin 446.  Triglycerides 170 (lipid panel otherwise normal), BMP normal with exception of glucose 104.  LFTs normal TB 0.6, ALP 54, ALT 24, AST 19.  PSA 0.6.  T3 uptake 29.  Free thyroxine index 1.9.  Iron 77, TIBC 324, iron saturation 24%.  CBC: WBC 6.2, hemoglobin 14.8, MCV 93, platelets 214.  TSH 2.330.

## 2025-04-10 ENCOUNTER — OFFICE VISIT (OUTPATIENT)
Dept: URBAN - METROPOLITAN AREA CLINIC 113 | Facility: CLINIC | Age: 45
End: 2025-04-10
Payer: COMMERCIAL

## 2025-04-10 ENCOUNTER — DASHBOARD ENCOUNTERS (OUTPATIENT)
Age: 45
End: 2025-04-10

## 2025-04-10 ENCOUNTER — ERX REFILL RESPONSE (OUTPATIENT)
Dept: URBAN - METROPOLITAN AREA CLINIC 113 | Facility: CLINIC | Age: 45
End: 2025-04-10

## 2025-04-10 DIAGNOSIS — K51.00 ULCERATIVE PANCOLITIS WITHOUT COMPLICATION: ICD-10-CM

## 2025-04-10 PROCEDURE — 99213 OFFICE O/P EST LOW 20 MIN: CPT | Performed by: INTERNAL MEDICINE

## 2025-04-10 RX ORDER — MESALAMINE 0.38 G/1
TAKE FOUR CAPSULES BY MOUTH EVERY MORNING CAPSULE ORAL
Qty: 360 CAPSULE | Refills: 4

## 2025-04-10 RX ORDER — ICOSAPENT ETHYL 1000 MG/1
2 CAPSULES WITH MEALS CAPSULE ORAL TWICE A DAY
Status: ACTIVE | COMMUNITY

## 2025-04-10 RX ORDER — ATORVASTATIN CALCIUM 10 MG/1
1 TABLET TABLET, FILM COATED ORAL ONCE A DAY
Status: ACTIVE | COMMUNITY

## 2025-04-10 RX ORDER — PSYLLIUM SEED (WITH DEXTROSE)
AS DIRECTED POWDER (GRAM) ORAL
Status: ACTIVE | COMMUNITY

## 2025-04-10 RX ORDER — LOSARTAN POTASSIUM AND HYDROCHLOROTHIAZIDE 50; 12.5 MG/1; MG/1
1 TABLET TABLET, FILM COATED ORAL ONCE A DAY
Status: ACTIVE | COMMUNITY

## 2025-04-10 RX ORDER — MESALAMINE 500 MG/1
TAKE TWO CAPSULES BY MOUTH FOUR TIMES A DAY CAPSULE ORAL
Qty: 240 CAPSULE | Refills: 3 | Status: ON HOLD | COMMUNITY

## 2025-04-10 RX ORDER — MESALAMINE 0.38 G/1
TAKE FOUR CAPSULES BY MOUTH EVERY MORNING CAPSULE, EXTENDED RELEASE ORAL
Qty: 360 | Refills: 4 | Status: ACTIVE | COMMUNITY

## 2025-04-10 RX ORDER — AMLODIPINE BESYLATE 5 MG/1
1 TABLET TABLET ORAL ONCE A DAY
Status: ACTIVE | COMMUNITY

## 2025-04-10 RX ORDER — MESALAMINE 0.38 G/1
TAKE FOUR CAPSULES BY MOUTH EVERY MORNING CAPSULE, EXTENDED RELEASE ORAL
Qty: 360 | Refills: 4
Start: 2025-04-12

## 2025-04-10 RX ORDER — ESOMEPRAZOLE MAGNESIUM 40 MG/1
TAKE ONE CAPSULE BY MOUTH TWICE A DAY CAPSULE, DELAYED RELEASE ORAL
Qty: 60 CAPSULE | Refills: 3 | Status: ON HOLD | COMMUNITY

## 2025-04-10 NOTE — HPI-OTHER HISTORIES
Labs 2/23/2024:Uric acid 5.1. T46.4. Creatinine kinase 99. Hemoglobin A1c 5.9. Urinalysis unremarkable. Ferritin 446. Triglycerides 170 (lipid panel otherwise normal), BMP normal with exception of glucose 104. LFTs normal TB 0.6, ALP 54, ALT 24, AST 19. PSA 0.6. T3 uptake 29. Free thyroxine index 1.9. Iron 77, TIBC 324, iron saturation 24%. CBC: WBC 6.2, hemoglobin 14.8, MCV 93, platelets 214. TSH 2.330.  Surveillance colonoscopy 3/27/2023:BBPS 9 (Suprep), entire examined colon normal status post biopsy, normal examined terminal ileum. Pathology: Random biopsies demonstrated no significant abnormality. Surveillance recommended in 2025.

## 2025-04-10 NOTE — PHYSICAL EXAM EYES:
Conjunctivae and eyelids appear normal,  Sclerae : no icterus
all other ROS negative except as per HPI

## 2025-04-10 NOTE — HPI-TODAY'S VISIT:
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 He was last seen here on 3/29/24.  He was last seen in the office 8/31/2022.  He remained in deep remission on Pentasa (mesalamine 500 mg 2 capsules 4 times a day).  He was doing well from a clinical standpoint.  Due to a change in strength, he was concerned regarding starting mesalamine ER 0.375 g 4 tablets daily.  He was reassured regarding the efficacy of both medications regardless of strength due to delivery systems.  He was to begin mesalamine ER once he had exhausted his supply of his current medication.  He was taking esomeprazole 40 mg twice a day.  He denied acid reflux.  He had persistent left upper quadrant abdominal pain that waxed and waned in severity unassociated with meals or defecation.  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  He was prescribed hyoscyamine for symptomatic relief.  This was discussed with Dr. Flores post visit who did not feel as though repeat EGD was indicated at that time.  Labs showed low iron and iron saturation.  He was not anemic.  He was to continue daily iron per PCP recommendation.  Constipation was slightly worse on iron.  He was to change his formulation and continue Metamucil daily.  Surveillance colonoscopy 3/27/2023:BBPS 9 (Suprep), entire examined colon normal status post biopsy, normal examined terminal ileum.  Pathology: Random biopsies demonstrated no significant abnormality.  Surveillance recommended in 2025.  At his 3/29/24 OV, he was doing well on mesalamine 0.375 g 4 tablets daily.  He was having regular bowel movements.  He stopped taking esomeprazole due to persistent, occasional discomfort in the left upper quadrant that had not improved on medication.  He denied any other abdominal symptoms.  The patient continues to do well on mesalamine.  He has no rectal bleeding, diarrhea, or other GI complaints.